# Patient Record
Sex: FEMALE | Race: BLACK OR AFRICAN AMERICAN | NOT HISPANIC OR LATINO | URBAN - METROPOLITAN AREA
[De-identification: names, ages, dates, MRNs, and addresses within clinical notes are randomized per-mention and may not be internally consistent; named-entity substitution may affect disease eponyms.]

---

## 2018-03-23 LAB
HPV HIGH RISK (HISTORICAL): NEGATIVE
SPECIMEN SOURCE (HISTORICAL): NORMAL

## 2018-04-12 ENCOUNTER — CONVERSION ENCOUNTER (OUTPATIENT)
Dept: MAMMOGRAPHY | Facility: CLINIC | Age: 53
End: 2018-04-12

## 2018-04-12 LAB
ABSOL LYMPHOCYTES (HISTORICAL): 2 K/UL (ref 0.5–4)
ALBUMIN SERPL BCP-MCNC: 3.2 G/DL (ref 3–5.2)
ALP SERPL-CCNC: 104 U/L (ref 43–122)
ALT SERPL W P-5'-P-CCNC: 25 U/L (ref 9–52)
AMORPHOUS MATERIAL (HISTORICAL): ABNORMAL
ANION GAP SERPL CALCULATED.3IONS-SCNC: 7 MMOL/L (ref 5–14)
AST SERPL W P-5'-P-CCNC: 17 U/L (ref 14–36)
BACTERIA UR QL AUTO: ABNORMAL
BASOPHILS # BLD AUTO: 0.1 K/UL (ref 0–0.1)
BASOPHILS # BLD AUTO: 1 % (ref 0–1)
BILIRUB SERPL-MCNC: <0.1 MG/DL
BILIRUB UR QL STRIP: NEGATIVE MG/DL
BUN SERPL-MCNC: 23 MG/DL (ref 5–25)
CALCIUM SERPL-MCNC: 9 MG/DL (ref 8.4–10.2)
CASTS/CASTS TYPE (HISTORICAL): ABNORMAL /LPF
CHLORIDE SERPL-SCNC: 101 MEQ/L (ref 97–108)
CLARITY UR: CLEAR
CO2 SERPL-SCNC: 27 MMOL/L (ref 22–30)
COLOR UR: ABNORMAL
CREATINE, SERUM (HISTORICAL): 0.99 MG/DL (ref 0.6–1.2)
CRYSTAL TYPE (HISTORICAL): ABNORMAL /HPF
DEPRECATED RDW RBC AUTO: 13.6 %
EGFR (HISTORICAL): 59 ML/MIN/1.73 M2
EOSINOPHIL # BLD AUTO: 0.2 K/UL (ref 0–0.4)
EOSINOPHIL NFR BLD AUTO: 2 % (ref 0–6)
GLUCOSE SERPL-MCNC: 295 MG/DL (ref 70–99)
GLUCOSE UR STRIP-MCNC: >=1000 MG/DL
HCT VFR BLD AUTO: 36.1 % (ref 36–46)
HGB BLD-MCNC: 11.9 G/DL (ref 12–16)
HGB UR QL STRIP.AUTO: ABNORMAL
KETONES UR STRIP-MCNC: NEGATIVE MG/DL
LEUKOCYTE ESTERASE UR QL STRIP: NEGATIVE
LYMPHOCYTES NFR BLD AUTO: 21 % (ref 25–45)
MCH RBC QN AUTO: 28.1 PG (ref 26–34)
MCHC RBC AUTO-ENTMCNC: 33 % (ref 31–36)
MCV RBC AUTO: 85 FL (ref 80–100)
MONOCYTES # BLD AUTO: 0.7 K/UL (ref 0.2–0.9)
MONOCYTES NFR BLD AUTO: 8 % (ref 1–10)
MUCOUS THREADS URNS QL MICRO: ABNORMAL
NEUTROPHILS ABS COUNT (HISTORICAL): 6.3 K/UL (ref 1.8–7.8)
NEUTS SEG NFR BLD AUTO: 68 % (ref 45–65)
NITRITE UR QL STRIP: NEGATIVE
NON-SQ EPI CELLS URNS QL MICRO: ABNORMAL
OTHER STN SPEC: ABNORMAL
PH UR STRIP.AUTO: 6 [PH] (ref 4.5–8)
PLATELET # BLD AUTO: 273 K/MCL (ref 150–450)
POTASSIUM SERPL-SCNC: 4.7 MEQ/L (ref 3.6–5)
PROT UR STRIP-MCNC: 100 MG/DL
RBC # BLD AUTO: 4.23 M/MCL (ref 4–5.2)
RBC #/AREA URNS AUTO: 6 /HPF
SODIUM SERPL-SCNC: 136 MEQ/L (ref 137–147)
SP GR UR STRIP.AUTO: 1.01 (ref 1–1.04)
TOTAL PROTEIN (HISTORICAL): 5.9 G/DL (ref 5.9–8.4)
UROBILINOGEN UR QL STRIP.AUTO: NEGATIVE MG/DL (ref 0–1)
WBC # BLD AUTO: 9.3 K/MCL (ref 4.5–11)
WBC #/AREA URNS AUTO: ABNORMAL /HPF

## 2018-05-10 LAB
CALCIUM SERPL-MCNC: 7.7 MG/DL (ref 8.4–10.2)
GLUCOSE SERPL-MCNC: 106 MG/DL (ref 70–99)
GLUCOSE SERPL-MCNC: 133 MG/DL (ref 70–99)
GLUCOSE SERPL-MCNC: 246 MG/DL (ref 70–99)
GLUCOSE SERPL-MCNC: 249 MG/DL (ref 70–99)
PREGNANCY TEST URINE (HISTORICAL): NEGATIVE

## 2018-05-11 LAB
ABSOL LYMPHOCYTES (HISTORICAL): 1.5 K/UL (ref 0.5–4)
ALBUMIN SERPL BCP-MCNC: 3 G/DL (ref 3–5.2)
ALP SERPL-CCNC: 100 U/L (ref 43–122)
ALT SERPL W P-5'-P-CCNC: 37 U/L (ref 9–52)
ANION GAP SERPL CALCULATED.3IONS-SCNC: 10 MMOL/L (ref 5–14)
AST SERPL W P-5'-P-CCNC: 28 U/L (ref 14–36)
BASOPHILS # BLD AUTO: 0.2 K/UL (ref 0–0.1)
BASOPHILS # BLD AUTO: 1 % (ref 0–1)
BILIRUB SERPL-MCNC: 0.3 MG/DL
BUN SERPL-MCNC: 27 MG/DL (ref 5–25)
CALCIUM SERPL-MCNC: 8.3 MG/DL (ref 8.4–10.2)
CHLORIDE SERPL-SCNC: 104 MEQ/L (ref 97–108)
CO2 SERPL-SCNC: 21 MMOL/L (ref 22–30)
COMMENT (HISTORICAL): ABNORMAL
CREATINE, SERUM (HISTORICAL): 1 MG/DL (ref 0.6–1.2)
DEPRECATED RDW RBC AUTO: 14.1 %
EGFR (HISTORICAL): 58 ML/MIN/1.73 M2
EOSINOPHIL # BLD AUTO: 0 K/UL (ref 0–0.4)
EOSINOPHIL NFR BLD AUTO: 0 % (ref 0–6)
FERRITIN SERPL-MCNC: 28 NG/ML (ref 11–264)
GLUCOSE SERPL-MCNC: 196 MG/DL (ref 70–99)
GLUCOSE SERPL-MCNC: 221 MG/DL (ref 70–99)
GLUCOSE SERPL-MCNC: 238 MG/DL (ref 70–99)
GLUCOSE SERPL-MCNC: 245 MG/DL (ref 70–99)
GLUCOSE SERPL-MCNC: 311 MG/DL (ref 70–99)
HCT VFR BLD AUTO: 34.3 % (ref 36–46)
HGB BLD-MCNC: 11.2 G/DL (ref 12–16)
LYMPHOCYTES NFR BLD AUTO: 8 % (ref 25–45)
MCH RBC QN AUTO: 28.2 PG (ref 26–34)
MCHC RBC AUTO-ENTMCNC: 32.6 % (ref 31–36)
MCV RBC AUTO: 87 FL (ref 80–100)
MONOCYTES # BLD AUTO: 2.1 K/UL (ref 0.2–0.9)
MONOCYTES NFR BLD AUTO: 11 % (ref 1–10)
NEUTROPHILS ABS COUNT (HISTORICAL): 15.4 K/UL (ref 1.8–7.8)
NEUTS SEG NFR BLD AUTO: 80 % (ref 45–65)
PLATELET # BLD AUTO: 249 K/MCL (ref 150–450)
POTASSIUM SERPL-SCNC: 4.3 MEQ/L (ref 3.6–5)
RBC # BLD AUTO: 3.96 M/MCL (ref 4–5.2)
RBC MORPHOLOGY (HISTORICAL): ABNORMAL
SODIUM SERPL-SCNC: 134 MEQ/L (ref 137–147)
TOTAL PROTEIN (HISTORICAL): 5.5 G/DL (ref 5.9–8.4)
VIT B12 SERPL-MCNC: 611 PG/ML (ref 239–931)
WBC # BLD AUTO: 19.2 K/MCL (ref 4.5–11)

## 2018-05-12 LAB
ABSOL LYMPHOCYTES (HISTORICAL): 2.5 K/UL (ref 0.5–4)
ALBUMIN SERPL BCP-MCNC: 3 G/DL (ref 3–5.2)
ALP SERPL-CCNC: 151 U/L (ref 43–122)
ALT SERPL W P-5'-P-CCNC: 42 U/L (ref 9–52)
ANION GAP SERPL CALCULATED.3IONS-SCNC: 6 MMOL/L (ref 5–14)
AST SERPL W P-5'-P-CCNC: 33 U/L (ref 14–36)
BASOPHILS # BLD AUTO: 0 % (ref 0–1)
BASOPHILS # BLD AUTO: 0 K/UL (ref 0–0.1)
BILIRUB SERPL-MCNC: 0.2 MG/DL
BUN SERPL-MCNC: 31 MG/DL (ref 5–25)
CALCIUM SERPL-MCNC: 8.5 MG/DL (ref 8.4–10.2)
CHLORIDE SERPL-SCNC: 106 MEQ/L (ref 97–108)
CO2 SERPL-SCNC: 22 MMOL/L (ref 22–30)
COMMENT (HISTORICAL): ABNORMAL
CREATINE, SERUM (HISTORICAL): 1.45 MG/DL (ref 0.6–1.2)
DEPRECATED RDW RBC AUTO: 14.3 %
EGFR (HISTORICAL): 38 ML/MIN/1.73 M2
EOSINOPHIL # BLD AUTO: 0.2 K/UL (ref 0–0.4)
EOSINOPHIL NFR BLD AUTO: 1 % (ref 0–6)
GLUCOSE SERPL-MCNC: 178 MG/DL (ref 70–99)
GLUCOSE SERPL-MCNC: 193 MG/DL (ref 70–99)
GLUCOSE SERPL-MCNC: 196 MG/DL (ref 70–99)
GLUCOSE SERPL-MCNC: 198 MG/DL (ref 70–99)
GLUCOSE SERPL-MCNC: 201 MG/DL (ref 70–99)
GLUCOSE SERPL-MCNC: 213 MG/DL (ref 70–99)
GLUCOSE SERPL-MCNC: 255 MG/DL (ref 70–99)
HCT VFR BLD AUTO: 33.4 % (ref 36–46)
HGB BLD-MCNC: 10.7 G/DL (ref 12–16)
LYMPHOCYTES NFR BLD AUTO: 15 % (ref 25–45)
MCH RBC QN AUTO: 28.2 PG (ref 26–34)
MCHC RBC AUTO-ENTMCNC: 32 % (ref 31–36)
MCV RBC AUTO: 88 FL (ref 80–100)
MONOCYTES # BLD AUTO: 2 K/UL (ref 0.2–0.9)
MONOCYTES NFR BLD AUTO: 12 % (ref 1–10)
NEUTROPHILS ABS COUNT (HISTORICAL): 11.7 K/UL (ref 1.8–7.8)
NEUTS SEG NFR BLD AUTO: 72 % (ref 45–65)
PLATELET # BLD AUTO: 166 K/MCL (ref 150–450)
POTASSIUM SERPL-SCNC: 4.6 MEQ/L (ref 3.6–5)
RBC # BLD AUTO: 3.79 M/MCL (ref 4–5.2)
RBC MORPHOLOGY (HISTORICAL): ABNORMAL
SODIUM SERPL-SCNC: 134 MEQ/L (ref 137–147)
TOTAL PROTEIN (HISTORICAL): 5.6 G/DL (ref 5.9–8.4)
WBC # BLD AUTO: 16.4 K/MCL (ref 4.5–11)

## 2018-05-13 LAB
ABSOL LYMPHOCYTES (HISTORICAL): 2.2 K/UL (ref 0.5–4)
ALBUMIN SERPL BCP-MCNC: 2.6 G/DL (ref 3–5.2)
ALP SERPL-CCNC: 182 U/L (ref 43–122)
ALT SERPL W P-5'-P-CCNC: 56 U/L (ref 9–52)
AMORPHOUS MATERIAL (HISTORICAL): NORMAL
ANION GAP SERPL CALCULATED.3IONS-SCNC: 7 MMOL/L (ref 5–14)
AST SERPL W P-5'-P-CCNC: 55 U/L (ref 14–36)
BACTERIA UR QL AUTO: NORMAL
BANDS (HISTORICAL): 2 % (ref 3–11)
BILIRUB SERPL-MCNC: 0.1 MG/DL
BILIRUB UR QL STRIP: NEGATIVE MG/DL
BUN SERPL-MCNC: 31 MG/DL (ref 5–25)
CALCIUM SERPL-MCNC: 8.5 MG/DL (ref 8.4–10.2)
CASTS/CASTS TYPE (HISTORICAL): NORMAL /LPF
CHLORIDE SERPL-SCNC: 110 MEQ/L (ref 97–108)
CK SERPL-CCNC: 207 U/L (ref 30–135)
CLARITY UR: CLEAR
CO2 SERPL-SCNC: 21 MMOL/L (ref 22–30)
COLOR UR: YELLOW
COMMENT (HISTORICAL): ABNORMAL
CREATINE, SERUM (HISTORICAL): 1.05 MG/DL (ref 0.6–1.2)
CRYSTAL TYPE (HISTORICAL): NORMAL /HPF
DEPRECATED RDW RBC AUTO: 14.3 %
EGFR (HISTORICAL): 55 ML/MIN/1.73 M2
EOSINOPHIL # BLD AUTO: 0.3 K/UL (ref 0–0.4)
EOSINOPHIL NFR BLD AUTO: 2 % (ref 0–6)
GLUCOSE SERPL-MCNC: 118 MG/DL (ref 70–99)
GLUCOSE SERPL-MCNC: 120 MG/DL (ref 70–99)
GLUCOSE SERPL-MCNC: 131 MG/DL (ref 70–99)
GLUCOSE SERPL-MCNC: 153 MG/DL (ref 70–99)
GLUCOSE SERPL-MCNC: 154 MG/DL (ref 70–99)
GLUCOSE SERPL-MCNC: 160 MG/DL (ref 70–99)
GLUCOSE UR STRIP-MCNC: 50 MG/DL
HCT VFR BLD AUTO: 32.3 % (ref 36–46)
HGB BLD-MCNC: 10.6 G/DL (ref 12–16)
HGB UR QL STRIP.AUTO: ABNORMAL
KETONES UR STRIP-MCNC: NEGATIVE MG/DL
LEUKOCYTE ESTERASE UR QL STRIP: NEGATIVE
LYMPHOCYTES NFR BLD AUTO: 14 % (ref 25–45)
MCH RBC QN AUTO: 28.6 PG (ref 26–34)
MCHC RBC AUTO-ENTMCNC: 32.9 % (ref 31–36)
MCV RBC AUTO: 87 FL (ref 80–100)
MONOCYTES # BLD AUTO: 1.2 K/UL (ref 0.2–0.9)
MONOCYTES NFR BLD AUTO: 8 % (ref 1–10)
MUCOUS THREADS URNS QL MICRO: NORMAL
NEUTROPHILS ABS COUNT (HISTORICAL): 11.8 K/UL (ref 1.8–7.8)
NEUTS SEG NFR BLD AUTO: 74 % (ref 45–65)
NITRITE UR QL STRIP: NEGATIVE
NON-SQ EPI CELLS URNS QL MICRO: NORMAL
OTHER STN SPEC: NORMAL
PH UR STRIP.AUTO: 6 [PH] (ref 4.5–8)
PLATELET # BLD AUTO: 227 K/MCL (ref 150–450)
POTASSIUM SERPL-SCNC: 4.7 MEQ/L (ref 3.6–5)
PROLACTIN (HISTORICAL): 38 NG/ML
PROT UR STRIP-MCNC: 100 MG/DL
RBC # BLD AUTO: 3.71 M/MCL (ref 4–5.2)
RBC #/AREA URNS AUTO: NORMAL /HPF
RBC MORPHOLOGY (HISTORICAL): ABNORMAL
SODIUM SERPL-SCNC: 137 MEQ/L (ref 137–147)
SP GR UR STRIP.AUTO: 1.02 (ref 1–1.04)
TOTAL PROTEIN (HISTORICAL): 5.2 G/DL (ref 5.9–8.4)
UROBILINOGEN UR QL STRIP.AUTO: NEGATIVE MG/DL (ref 0–1)
WBC # BLD AUTO: 15.5 K/MCL (ref 4.5–11)
WBC #/AREA URNS AUTO: NORMAL /HPF

## 2018-05-14 LAB
10-HYDROXYCARBAZEPINE (HISTORICAL): 10
ABSOL LYMPHOCYTES (HISTORICAL): 2.3 K/UL (ref 0.5–4)
ANION GAP SERPL CALCULATED.3IONS-SCNC: 6 MMOL/L (ref 5–14)
BASOPHILS # BLD AUTO: 0.1 K/UL (ref 0–0.1)
BASOPHILS # BLD AUTO: 1 % (ref 0–1)
BUN SERPL-MCNC: 33 MG/DL (ref 5–25)
CALCIUM SERPL-MCNC: 8.7 MG/DL (ref 8.4–10.2)
CHLORIDE SERPL-SCNC: 109 MEQ/L (ref 97–108)
CO2 SERPL-SCNC: 23 MMOL/L (ref 22–30)
CREATINE, SERUM (HISTORICAL): 1.1 MG/DL (ref 0.6–1.2)
DEPRECATED RDW RBC AUTO: 14 %
EGFR (HISTORICAL): 52 ML/MIN/1.73 M2
EOSINOPHIL # BLD AUTO: 0.3 K/UL (ref 0–0.4)
EOSINOPHIL NFR BLD AUTO: 2 % (ref 0–6)
GLUCOSE FASTING (HISTORICAL): 114 MG/DL (ref 70–99)
GLUCOSE SERPL-MCNC: 157 MG/DL (ref 70–99)
GLUCOSE SERPL-MCNC: 175 MG/DL (ref 70–99)
GLUCOSE SERPL-MCNC: 189 MG/DL (ref 70–99)
GLUCOSE SERPL-MCNC: 260 MG/DL (ref 70–99)
HCT VFR BLD AUTO: 31.6 % (ref 36–46)
HGB BLD-MCNC: 10.2 G/DL (ref 12–16)
LYMPHOCYTES NFR BLD AUTO: 19 % (ref 25–45)
MCH RBC QN AUTO: 28 PG (ref 26–34)
MCHC RBC AUTO-ENTMCNC: 32.3 % (ref 31–36)
MCV RBC AUTO: 87 FL (ref 80–100)
MONOCYTES # BLD AUTO: 1.2 K/UL (ref 0.2–0.9)
MONOCYTES NFR BLD AUTO: 10 % (ref 1–10)
NEUTROPHILS ABS COUNT (HISTORICAL): 8.6 K/UL (ref 1.8–7.8)
NEUTS SEG NFR BLD AUTO: 68 % (ref 45–65)
PLATELET # BLD AUTO: 231 K/MCL (ref 150–450)
POTASSIUM SERPL-SCNC: 4.7 MEQ/L (ref 3.6–5)
PROLACTIN (HISTORICAL): 25.1 NG/ML
RBC # BLD AUTO: 3.63 M/MCL (ref 4–5.2)
SODIUM SERPL-SCNC: 138 MEQ/L (ref 137–147)
WBC # BLD AUTO: 12.4 K/MCL (ref 4.5–11)

## 2018-05-15 LAB
ABSOL LYMPHOCYTES (HISTORICAL): 2.4 K/UL (ref 0.5–4)
ALBUMIN SERPL BCP-MCNC: 2.5 G/DL (ref 3–5.2)
ALP SERPL-CCNC: 199 U/L (ref 43–122)
ALT SERPL W P-5'-P-CCNC: 49 U/L (ref 9–52)
ANION GAP SERPL CALCULATED.3IONS-SCNC: 5 MMOL/L (ref 5–14)
AST SERPL W P-5'-P-CCNC: 24 U/L (ref 14–36)
BASOPHILS # BLD AUTO: 0 % (ref 0–1)
BASOPHILS # BLD AUTO: 0 K/UL (ref 0–0.1)
BILIRUB SERPL-MCNC: 0.1 MG/DL
BUN SERPL-MCNC: 29 MG/DL (ref 5–25)
CALCIUM SERPL-MCNC: 8.6 MG/DL (ref 8.4–10.2)
CHLORIDE SERPL-SCNC: 105 MEQ/L (ref 97–108)
CO2 SERPL-SCNC: 26 MMOL/L (ref 22–30)
CREATINE, SERUM (HISTORICAL): 0.95 MG/DL (ref 0.6–1.2)
DEPRECATED RDW RBC AUTO: 13.8 %
EGFR (HISTORICAL): >60 ML/MIN/1.73 M2
EOSINOPHIL # BLD AUTO: 0.4 K/UL (ref 0–0.4)
EOSINOPHIL NFR BLD AUTO: 3 % (ref 0–6)
GLUCOSE SERPL-MCNC: 120 MG/DL (ref 70–99)
GLUCOSE SERPL-MCNC: 171 MG/DL (ref 70–99)
GLUCOSE SERPL-MCNC: 181 MG/DL (ref 70–99)
GLUCOSE SERPL-MCNC: 219 MG/DL (ref 70–99)
GLUCOSE SERPL-MCNC: 85 MG/DL (ref 70–99)
HCT VFR BLD AUTO: 29.8 % (ref 36–46)
HGB BLD-MCNC: 9.8 G/DL (ref 12–16)
LYMPHOCYTES NFR BLD AUTO: 19 % (ref 25–45)
MCH RBC QN AUTO: 28.5 PG (ref 26–34)
MCHC RBC AUTO-ENTMCNC: 32.9 % (ref 31–36)
MCV RBC AUTO: 87 FL (ref 80–100)
MONOCYTES # BLD AUTO: 1.5 K/UL (ref 0.2–0.9)
MONOCYTES NFR BLD AUTO: 11 % (ref 1–10)
NEUTROPHILS ABS COUNT (HISTORICAL): 8.5 K/UL (ref 1.8–7.8)
NEUTS SEG NFR BLD AUTO: 67 % (ref 45–65)
PLATELET # BLD AUTO: 241 K/MCL (ref 150–450)
POTASSIUM SERPL-SCNC: 4.5 MEQ/L (ref 3.6–5)
RBC # BLD AUTO: 3.45 M/MCL (ref 4–5.2)
SODIUM SERPL-SCNC: 136 MEQ/L (ref 137–147)
TOTAL PROTEIN (HISTORICAL): 5.1 G/DL (ref 5.9–8.4)
WBC # BLD AUTO: 12.8 K/MCL (ref 4.5–11)

## 2018-05-16 LAB
10-HYDROXYCARBAZEPINE (HISTORICAL): 20
ABSOL LYMPHOCYTES (HISTORICAL): 2.1 K/UL (ref 0.5–4)
ANION GAP SERPL CALCULATED.3IONS-SCNC: 6 MMOL/L (ref 5–14)
BASOPHILS # BLD AUTO: 0.1 K/UL (ref 0–0.1)
BASOPHILS # BLD AUTO: 1 % (ref 0–1)
BUN SERPL-MCNC: 28 MG/DL (ref 5–25)
CALCIUM SERPL-MCNC: 9.1 MG/DL (ref 8.4–10.2)
CHLORIDE SERPL-SCNC: 103 MEQ/L (ref 97–108)
CO2 SERPL-SCNC: 27 MMOL/L (ref 22–30)
CREATINE, SERUM (HISTORICAL): 1.04 MG/DL (ref 0.6–1.2)
DEPRECATED RDW RBC AUTO: 13.8 %
EGFR (HISTORICAL): 56 ML/MIN/1.73 M2
EOSINOPHIL # BLD AUTO: 0.2 K/UL (ref 0–0.4)
EOSINOPHIL NFR BLD AUTO: 1 % (ref 0–6)
GLUCOSE SERPL-MCNC: 112 MG/DL (ref 70–99)
GLUCOSE SERPL-MCNC: 133 MG/DL (ref 70–99)
GLUCOSE SERPL-MCNC: 170 MG/DL (ref 70–99)
GLUCOSE SERPL-MCNC: 210 MG/DL (ref 70–99)
GLUCOSE SERPL-MCNC: 212 MG/DL (ref 70–99)
HCT VFR BLD AUTO: 30.5 % (ref 36–46)
HGB BLD-MCNC: 10 G/DL (ref 12–16)
LYMPHOCYTES NFR BLD AUTO: 15 % (ref 25–45)
MCH RBC QN AUTO: 28.2 PG (ref 26–34)
MCHC RBC AUTO-ENTMCNC: 32.9 % (ref 31–36)
MCV RBC AUTO: 86 FL (ref 80–100)
MONOCYTES # BLD AUTO: 1.8 K/UL (ref 0.2–0.9)
MONOCYTES NFR BLD AUTO: 12 % (ref 1–10)
NEUTROPHILS ABS COUNT (HISTORICAL): 10.4 K/UL (ref 1.8–7.8)
NEUTS SEG NFR BLD AUTO: 71 % (ref 45–65)
PLATELET # BLD AUTO: 270 K/MCL (ref 150–450)
POTASSIUM SERPL-SCNC: 4.3 MEQ/L (ref 3.6–5)
RBC # BLD AUTO: 3.55 M/MCL (ref 4–5.2)
SODIUM SERPL-SCNC: 136 MEQ/L (ref 137–147)
TSH SERPL DL<=0.05 MIU/L-ACNC: 0.02 UIU/ML (ref 0.47–4.68)
WBC # BLD AUTO: 14.6 K/MCL (ref 4.5–11)

## 2018-05-17 LAB
ABSOL LYMPHOCYTES (HISTORICAL): 2.3 K/UL (ref 0.5–4)
AMORPHOUS MATERIAL (HISTORICAL): ABNORMAL
ANION GAP SERPL CALCULATED.3IONS-SCNC: 5 MMOL/L (ref 5–14)
BACTERIA UR QL AUTO: ABNORMAL
BASOPHILS # BLD AUTO: 0.1 K/UL (ref 0–0.1)
BASOPHILS # BLD AUTO: 1 % (ref 0–1)
BILIRUB UR QL STRIP: NEGATIVE MG/DL
BUN SERPL-MCNC: 26 MG/DL (ref 5–25)
CALCIUM SERPL-MCNC: 8.9 MG/DL (ref 8.4–10.2)
CASTS/CASTS TYPE (HISTORICAL): ABNORMAL /LPF
CHLORIDE SERPL-SCNC: 102 MEQ/L (ref 97–108)
CLARITY UR: CLEAR
CO2 SERPL-SCNC: 27 MMOL/L (ref 22–30)
COLOR UR: ABNORMAL
COMMENT (HISTORICAL): ABNORMAL
CREATINE, SERUM (HISTORICAL): 1.05 MG/DL (ref 0.6–1.2)
CRYSTAL TYPE (HISTORICAL): ABNORMAL /HPF
DEPRECATED RDW RBC AUTO: 13.8 %
EGFR (HISTORICAL): 55 ML/MIN/1.73 M2
EOSINOPHIL # BLD AUTO: 0.3 K/UL (ref 0–0.4)
EOSINOPHIL NFR BLD AUTO: 2 % (ref 0–6)
GLUCOSE SERPL-MCNC: 153 MG/DL (ref 70–99)
GLUCOSE SERPL-MCNC: 179 MG/DL (ref 70–99)
GLUCOSE SERPL-MCNC: 192 MG/DL (ref 70–99)
GLUCOSE SERPL-MCNC: 264 MG/DL (ref 70–99)
GLUCOSE SERPL-MCNC: 341 MG/DL (ref 70–99)
GLUCOSE UR STRIP-MCNC: NEGATIVE MG/DL
HCT VFR BLD AUTO: 28.6 % (ref 36–46)
HGB BLD-MCNC: 9.4 G/DL (ref 12–16)
HGB UR QL STRIP.AUTO: ABNORMAL
KETONES UR STRIP-MCNC: NEGATIVE MG/DL
LEUKOCYTE ESTERASE UR QL STRIP: NEGATIVE
LYMPHOCYTES NFR BLD AUTO: 18 % (ref 25–45)
MCH RBC QN AUTO: 27.9 PG (ref 26–34)
MCHC RBC AUTO-ENTMCNC: 32.7 % (ref 31–36)
MCV RBC AUTO: 85 FL (ref 80–100)
MONOCYTES # BLD AUTO: 1.8 K/UL (ref 0.2–0.9)
MONOCYTES NFR BLD AUTO: 14 % (ref 1–10)
MUCOUS THREADS URNS QL MICRO: ABNORMAL
NEUTROPHILS ABS COUNT (HISTORICAL): 8.4 K/UL (ref 1.8–7.8)
NEUTS SEG NFR BLD AUTO: 65 % (ref 45–65)
NITRITE UR QL STRIP: NEGATIVE
NON-SQ EPI CELLS URNS QL MICRO: ABNORMAL
OTHER STN SPEC: ABNORMAL
PH UR STRIP.AUTO: 5 [PH] (ref 4.5–8)
PLATELET # BLD AUTO: 239 K/MCL (ref 150–450)
POTASSIUM SERPL-SCNC: 4.2 MEQ/L (ref 3.6–5)
PROT UR STRIP-MCNC: 30 MG/DL
RBC # BLD AUTO: 3.36 M/MCL (ref 4–5.2)
RBC #/AREA URNS AUTO: 3 /HPF
RBC MORPHOLOGY (HISTORICAL): ABNORMAL
SODIUM SERPL-SCNC: 134 MEQ/L (ref 137–147)
SP GR UR STRIP.AUTO: 1.01 (ref 1–1.04)
UROBILINOGEN UR QL STRIP.AUTO: NEGATIVE MG/DL (ref 0–1)
WBC # BLD AUTO: 12.9 K/MCL (ref 4.5–11)
WBC #/AREA URNS AUTO: ABNORMAL /HPF

## 2018-05-18 LAB
GLUCOSE SERPL-MCNC: 124 MG/DL (ref 70–99)
GLUCOSE SERPL-MCNC: 137 MG/DL (ref 70–99)
TSH SERPL DL<=0.05 MIU/L-ACNC: 0.09 UIU/ML (ref 0.47–4.68)

## 2018-06-22 LAB
ABSOL LYMPHOCYTES (HISTORICAL): 1.3 K/UL (ref 0.5–4)
ALBUMIN SERPL BCP-MCNC: 3.4 G/DL (ref 3–5.2)
ALP SERPL-CCNC: 103 U/L (ref 43–122)
ALT SERPL W P-5'-P-CCNC: 32 U/L (ref 9–52)
ANION GAP SERPL CALCULATED.3IONS-SCNC: 7 MMOL/L (ref 5–14)
AST SERPL W P-5'-P-CCNC: 26 U/L (ref 14–36)
BASOPHILS # BLD AUTO: 0.1 K/UL (ref 0–0.1)
BASOPHILS # BLD AUTO: 1 % (ref 0–1)
BILIRUB SERPL-MCNC: 0.1 MG/DL
BUN SERPL-MCNC: 31 MG/DL (ref 5–25)
CALCIUM SERPL-MCNC: 8.9 MG/DL (ref 8.4–10.2)
CHLORIDE SERPL-SCNC: 102 MEQ/L (ref 97–108)
CO2 SERPL-SCNC: 29 MMOL/L (ref 22–30)
COMMENT (HISTORICAL): ABNORMAL
CREATINE, SERUM (HISTORICAL): 1.1 MG/DL (ref 0.6–1.2)
DEPRECATED RDW RBC AUTO: 15 %
EGFR (HISTORICAL): 52 ML/MIN/1.73 M2
EOSINOPHIL # BLD AUTO: 0.2 K/UL (ref 0–0.4)
EOSINOPHIL NFR BLD AUTO: 2 % (ref 0–6)
GLUCOSE SERPL-MCNC: 113 MG/DL (ref 70–99)
GLUCOSE SERPL-MCNC: 122 MG/DL (ref 70–99)
HCT VFR BLD AUTO: 36.7 % (ref 36–46)
HGB BLD-MCNC: 12.4 G/DL (ref 12–16)
LYMPHOCYTES NFR BLD AUTO: 12 % (ref 25–45)
MCH RBC QN AUTO: 29.4 PG (ref 26–34)
MCHC RBC AUTO-ENTMCNC: 33.9 % (ref 31–36)
MCV RBC AUTO: 87 FL (ref 80–100)
MONOCYTES # BLD AUTO: 1 K/UL (ref 0.2–0.9)
MONOCYTES NFR BLD AUTO: 9 % (ref 1–10)
NEUTROPHILS ABS COUNT (HISTORICAL): 8.4 K/UL (ref 1.8–7.8)
NEUTS SEG NFR BLD AUTO: 76 % (ref 45–65)
PLATELET # BLD AUTO: 275 K/MCL (ref 150–450)
POTASSIUM SERPL-SCNC: 3.7 MEQ/L (ref 3.6–5)
RBC # BLD AUTO: 4.22 M/MCL (ref 4–5.2)
RBC MORPHOLOGY (HISTORICAL): ABNORMAL
SODIUM SERPL-SCNC: 139 MEQ/L (ref 137–147)
TOTAL PROTEIN (HISTORICAL): 6.4 G/DL (ref 5.9–8.4)
WBC # BLD AUTO: 11 K/MCL (ref 4.5–11)

## 2018-06-26 LAB — 10-HYDROXYCARBAZEPINE (HISTORICAL): <1

## 2018-06-27 ENCOUNTER — TELEPHONE (OUTPATIENT)
Dept: FAMILY MEDICINE CLINIC | Facility: CLINIC | Age: 53
End: 2018-06-27

## 2018-06-27 NOTE — TELEPHONE ENCOUNTER
Kuldip Infante states pt BP was 160/98 today's visit  She states pt was reporting a lot of pain and hadn't taken her pain meds prior to her BP being taken  Pt is asymptomatic at this time  She said they will call Friday if BP is still elevated  I told them to make sure patient isn't reporting severe pain when they take it as this could be a factor in her elevated reading today

## 2018-07-25 DIAGNOSIS — E89.0 POSTOPERATIVE HYPOTHYROIDISM: Primary | ICD-10-CM

## 2018-07-25 DIAGNOSIS — Z79.4 TYPE 2 DIABETES MELLITUS WITHOUT COMPLICATION, WITH LONG-TERM CURRENT USE OF INSULIN (HCC): Primary | ICD-10-CM

## 2018-07-25 DIAGNOSIS — E11.9 TYPE 2 DIABETES MELLITUS WITHOUT COMPLICATION, WITH LONG-TERM CURRENT USE OF INSULIN (HCC): Primary | ICD-10-CM

## 2018-07-25 RX ORDER — LANCETS 33 GAUGE
EACH MISCELLANEOUS
Qty: 120 EACH | Refills: 2 | Status: SHIPPED | OUTPATIENT
Start: 2018-07-25 | End: 2018-10-16

## 2018-07-25 RX ORDER — LEVOTHYROXINE SODIUM 175 UG/1
TABLET ORAL
Qty: 30 TABLET | Refills: 1 | Status: SHIPPED | OUTPATIENT
Start: 2018-07-25

## 2018-11-30 ENCOUNTER — HOSPITAL ENCOUNTER (EMERGENCY)
Dept: HOSPITAL 31 - C.ER | Age: 53
Discharge: HOME | End: 2018-11-30
Payer: MEDICAID

## 2018-11-30 VITALS — TEMPERATURE: 98 F | OXYGEN SATURATION: 97 %

## 2018-11-30 VITALS — RESPIRATION RATE: 16 BRPM | SYSTOLIC BLOOD PRESSURE: 142 MMHG | HEART RATE: 86 BPM | DIASTOLIC BLOOD PRESSURE: 88 MMHG

## 2018-11-30 VITALS — BODY MASS INDEX: 39.5 KG/M2

## 2018-11-30 DIAGNOSIS — I10: ICD-10-CM

## 2018-11-30 DIAGNOSIS — R10.9: Primary | ICD-10-CM

## 2018-11-30 DIAGNOSIS — E05.90: ICD-10-CM

## 2018-11-30 DIAGNOSIS — J45.909: ICD-10-CM

## 2018-11-30 DIAGNOSIS — Z90.49: ICD-10-CM

## 2018-11-30 DIAGNOSIS — E11.9: ICD-10-CM

## 2018-11-30 LAB
ALBUMIN SERPL-MCNC: 3.9 [, G/DL] (ref 3.5–5)
ALBUMIN/GLOB SERPL: 1.2 [,] (ref 1–2.1)
ALT SERPL-CCNC: 38 [, U/L] (ref 9–52)
AST SERPL-CCNC: 66 [, U/L] (ref 14–36)
BASE EXCESS BLDV CALC-SCNC: 4.5 [, MMOL/L] (ref 0–2)
BASOPHILS # BLD AUTO: 0.1 [, K/UL] (ref 0–0.2)
BASOPHILS NFR BLD: 0.9 [, %] (ref 0–2)
BILIRUB UR-MCNC: NEGATIVE [,]
BUN SERPL-MCNC: 21 [, MG/DL] (ref 7–17)
CALCIUM SERPL-MCNC: 9 [, MG/DL] (ref 8.6–10.4)
EOSINOPHIL # BLD AUTO: 0.2 [, K/UL] (ref 0–0.7)
EOSINOPHIL NFR BLD: 2.2 [, %] (ref 0–4)
ERYTHROCYTE [DISTWIDTH] IN BLOOD BY AUTOMATED COUNT: 14.9 [, %] (ref 11.5–14.5)
GFR NON-AFRICAN AMERICAN: 39 [,]
GLUCOSE UR STRIP-MCNC: NORMAL [, MG/DL]
HGB BLD-MCNC: 13.2 [, G/DL] (ref 11–16)
LEUKOCYTE ESTERASE UR-ACNC: (no result) [, LEU/UL]
LYMPHOCYTES # BLD AUTO: 3.1 [, K/UL] (ref 1–4.3)
LYMPHOCYTES NFR BLD AUTO: 27.7 [, %] (ref 20–40)
MCH RBC QN AUTO: 28.8 [, PG] (ref 27–31)
MCHC RBC AUTO-ENTMCNC: 33.1 [, G/DL] (ref 33–37)
MCV RBC AUTO: 87.1 [, FL] (ref 81–99)
MONOCYTES # BLD: 0.7 [, K/UL] (ref 0–0.8)
MONOCYTES NFR BLD: 6.6 [, %] (ref 0–10)
NEUTROPHILS # BLD: 7 [, K/UL] (ref 1.8–7)
NEUTROPHILS NFR BLD AUTO: 62.6 [, %] (ref 50–75)
NRBC BLD AUTO-RTO: 0 [, %] (ref 0–2)
PCO2 BLDV: 49 [, MMHG] (ref 40–60)
PH BLDV: 7.4 [,] (ref 7.32–7.43)
PH UR STRIP: 7 [,] (ref 5–8)
PLATELET # BLD: 238 [, K/UL] (ref 130–400)
PMV BLD AUTO: 8.7 [, FL] (ref 7.2–11.7)
PROT UR STRIP-MCNC: (no result) [, MG/DL]
RBC # BLD AUTO: 4.58 [, MIL/UL] (ref 3.8–5.2)
RBC # UR STRIP: (no result) [,]
SP GR UR STRIP: 1.01 [,] (ref 1–1.03)
SQUAMOUS EPITHIAL: 2 [, /HPF] (ref 0–5)
UROBILINOGEN UR-MCNC: NORMAL [, MG/DL] (ref 0.2–1)
VENOUS BLOOD FIO2: 21 [, %]
VENOUS BLOOD GAS PO2: 34 [, MM/HG] (ref 30–55)
WBC # BLD AUTO: 11.2 [, K/UL] (ref 4.8–10.8)

## 2018-11-30 PROCEDURE — 74177 CT ABD & PELVIS W/CONTRAST: CPT

## 2018-11-30 PROCEDURE — 85025 COMPLETE CBC W/AUTO DIFF WBC: CPT

## 2018-11-30 PROCEDURE — 81001 URINALYSIS AUTO W/SCOPE: CPT

## 2018-11-30 PROCEDURE — 82948 REAGENT STRIP/BLOOD GLUCOSE: CPT

## 2018-11-30 PROCEDURE — 82803 BLOOD GASES ANY COMBINATION: CPT

## 2018-11-30 PROCEDURE — 80053 COMPREHEN METABOLIC PANEL: CPT

## 2018-11-30 PROCEDURE — 96372 THER/PROPH/DIAG INJ SC/IM: CPT

## 2018-11-30 PROCEDURE — 99285 EMERGENCY DEPT VISIT HI MDM: CPT

## 2018-11-30 NOTE — C.PDOC
History Of Present Illness





Patient is a 53 year old Female with PMHx of DMII, seizures, HTN, neuropathy, 

and thyroid cancer who presents today for 2 weeks of abdominal pain radiating 

into her back and flank. Patient says she was evaluated 2 weeks ago at [facility] and 

thinks she had a chest xray done because she was also having chest and rib pain.

Patient was sent home and told to take Ibuprofen and Motrin. Since then patient 

says her abdominal pain has only worsened. Patient describes the pain as sharp 

stabbing pain that is epigastric and right sided and radiates to the right 

flank. Patient rates the pain 10/10. Patient also admits to swelling in her 

abdomen and LE swelling. Patient denies any dysuria or hematuria. Patient admits

to about 30lbs of weight gain since her thyroidectomy in August. Patient did not

take any of her home medications today due to the pain. Patient went to her PMD 

today and he called an ambulance to bring her to the ER. 





<Jesusita Tamayo - Last Filed: 11/30/18 16:58>


History Per: Patient


History/Exam Limitations: no limitations


Onset/Duration Of Symptoms: Days


Current Symptoms Are (Timing): Worse


Severity: Severe


Pain Scale Rating Of: 10


Location Of Pain/Discomfort: RUQ, Epigastric


Radiation Of Pain To:: Flank


Quality Of Discomfort: Sharp, Stabbing


Associated Symptoms: denies: Fever, Chills, Nausea, Vomiting, Diarrhea, 

Constipation


Exacerbating Factors: Movement, Deep Breaths


Alleviating Factors: None


Last Bowel Movement: Yesterday


Additional History Per: Patient


Abnormal Vaginal Bleeding: No





<Jesusita Tamayo - Last Filed: 11/30/18 16:58>





<Nora Altamirano - Last Filed: 11/30/18 18:21>


Time Seen by Provider: 11/30/18 15:01


Chief Complaint (Nursing): Abdominal Pain





Past Medical History


Vital Signs: 





                                Last Vital Signs











Temp  97.8 F   11/30/18 15:01


 


Pulse  72   11/30/18 15:01


 


Resp  20   11/30/18 15:01


 


BP  194/99 H  11/30/18 15:01


 


Pulse Ox  99   11/30/18 15:01














- Medical History


PMH: Asthma, Diabetes, HTN, Hyperthyroidism, Seizures


Surgical History: Cholecystectomy


Other Surgeries: thyroidectomy 2018


Family History: States: Unknown Family Hx





- Social History


Hx Tobacco Use: No


Hx Alcohol Use: No


Hx Substance Use: No





- Immunization History


Hx Tetanus Toxoid Vaccination: No


Hx Influenza Vaccination: No


Hx Pneumococcal Vaccination: No





<Jesusita Tamayo - Last Filed: 11/30/18 16:58>


Vital Signs: 





                                Last Vital Signs











Temp  97.8 F   11/30/18 15:01


 


Pulse  68   11/30/18 17:01


 


Resp  21   11/30/18 17:01


 


BP  194/122 H  11/30/18 17:02


 


Pulse Ox  100   11/30/18 17:01














<Nora Altamirano - Last Filed: 11/30/18 18:21>





Review Of Systems


Constitutional: Negative for: Fever, Chills


Cardiovascular: Negative for: Chest Pain, Palpitations


Respiratory: Negative for: Cough, Shortness of Breath


Gastrointestinal: Positive for: Abdominal Pain.  Negative for: Nausea, Vomiting,

Diarrhea, Constipation


Genitourinary: Negative for: Dysuria, Frequency, Hematuria


Musculoskeletal: Positive for: Back Pain (right sided back and flank pain)





<Jesusita Tamayo - Last Filed: 11/30/18 16:58>





Physical Exam





- Physical Exam


Appears: Non-toxic, In Acute Distress


Skin: Normal Color, Warm, Dry


Head: Atraumatic, Normacephalic


Eye(s): bilateral: Normal Inspection


Chest: Symmetrical


Cardiovascular: Rhythm Regular


Respiratory: Normal Breath Sounds, No Accessory Muscle Use, No Rales, No 

Rhonchi, No Stridor, No Wheezing


Gastrointestinal/Abdominal: Bowel Sounds, No Soft, Tenderness, Distention, 

Guarding, Other (distended)


Back: CVA Tenderness (right sided CVA tenderness)


Extremity: Pedal Edema (1+ pitting edema bilaterally)


Neurological/Psych: Oriented x3





<Jesusita Tamayo - Last Filed: 11/30/18 16:58>





ED Course And Treatment





- Laboratory Results


Result Diagrams: 


                                 11/30/18 16:20





O2 Sat by Pulse Oximetry: 99





<Jesusita Tamayo - Last Filed: 11/30/18 16:58>





- Laboratory Results


Result Diagrams: 


                                 11/30/18 16:20





                                 11/30/18 16:20


O2 Sat by Pulse Oximetry: 100 (RA)


Pulse Ox Interpretation: Normal


Progress Note: Blood work, UA, Upreg, CT abd/pelvis ordered and reviewed.  

Patient given IV morphine, IV labetolol for HTN.





<Nora Altamirano - Last Filed: 11/30/18 18:21>





Disposition





<Jesusita Tamayo - Last Filed: 11/30/18 16:58>





- Disposition


Disposition Time: 18:20





<Nora Altamirano - Last Filed: 11/30/18 18:21>





- Disposition


Condition: STABLE


Forms:  [employer] (English)





- Clinical Impression


Clinical Impression: 


 Abdominal pain








Physician Patient Turnover


Patient Signed Over To: Avelina Tai


Handoff Comments: Pending CT, reassessment.





<Nora Altamirano - Last Filed: 11/30/18 18:21>

## 2018-12-01 NOTE — CT
Date of service: 



11/30/2018



PROCEDURE:  CT Abdomen and Pelvis with intravenous contrast



HISTORY:

severe abdominal pain, abdominal distention



COMPARISON:

None.



TECHNIQUE:

Multiple contiguous axial images were performed through the abdomen 

and pelvis with the use of intravenous contrast.  Subsequently, 

sagittal and coronal reformatted images were obtained. 



Radiation dose:



Total exam DLP = 1228.66 mGy-cm.



This CT exam was performed using one or more of the following dose 

reduction techniques: Automated exposure control, adjustment of the 

mA and/or kV according to patient size, and/or use of iterative 

reconstruction technique.



FINDINGS:



LOWER THORAX:

Scattered atelectasis and consolidative changes at the lung bases.  7 

millimeter nodular density seen within the medial aspect of the left 

upper lobe on series 3, image 1. 



LIVER:

Mild intrahepatic biliary ductal dilatation. 



GALLBLADDER AND BILE DUCTS:

Prior cholecystectomy. 



PANCREAS:

Unremarkable. No gross lesion or ductal dilatation.



SPLEEN:

Unremarkable. 



ADRENALS:

Unremarkable. No mass. 



KIDNEYS AND URETERS:

Unremarkable. No hydronephrosis. No solid mass.  Punctate 

subcentimeter hypodensity seen within the mid to lower pole of the 

right kidney, too small to adequately characterize. 



VASCULATURE:

Unremarkable. No aortic aneurysm. Aortic atherosclerotic 

calcification or mural plaque present.



BOWEL:

Unremarkable. No obstruction. No gross mural thickening.  Small 

hiatal hernia.  Moderate fecal retention in the colon. 



APPENDIX:

Unremarkable. Normal appendix. 



PERITONEUM:

Unremarkable. No free fluid. No free air. 



LYMPH NODES:

Unremarkable. No enlarged lymph nodes. 



BLADDER:

Unremarkable. 



REPRODUCTIVE:

Heterogeneous multi fibroid uterus.  Correlation with pelvic 

ultrasound may be helpful if clinically indicated.  Prominence of the 

bilateral adnexa with some trace free fluid adjacent to the bilateral 

adnexa.  Again correlation with pelvic ultrasound may be helpful if 

clinically indicated. 



BONES:

Degenerative changes in the spine and shoulders.  Mild retrolisthesis 

of L5 on S1.  



OTHER FINDINGS:

Reticulation and edema within the circumferential subcutaneous soft 

tissues suggestive for anasarca.



IMPRESSION:

Bulky heterogeneous multi fibroid uterus.  Prominence of bilateral 

adnexa.  Trace free fluid within the bilateral adnexa and pelvic 

cul-de-sac.  Correlation with pelvic ultrasound may be helpful if 

clinically indicated.  



Moderate fecal retention in the colon.  



Small hiatal hernia. 



Additional findings as above. 



A preliminary report was generated at 9:09 p.m. on 11/30/2018 by Dr. Dakotah Rios from [email or web address].

## 2019-01-22 ENCOUNTER — HOSPITAL ENCOUNTER (EMERGENCY)
Dept: HOSPITAL 14 - H.ER | Age: 54
LOS: 1 days | Discharge: HOME | End: 2019-01-23
Payer: MEDICARE

## 2019-01-22 VITALS — OXYGEN SATURATION: 98 %

## 2019-01-22 VITALS — BODY MASS INDEX: 39.5 KG/M2

## 2019-01-22 DIAGNOSIS — I10: ICD-10-CM

## 2019-01-22 DIAGNOSIS — J45.909: ICD-10-CM

## 2019-01-22 DIAGNOSIS — E11.9: ICD-10-CM

## 2019-01-22 DIAGNOSIS — K59.00: Primary | ICD-10-CM

## 2019-01-22 DIAGNOSIS — Z79.4: ICD-10-CM

## 2019-01-22 DIAGNOSIS — G40.909: ICD-10-CM

## 2019-01-22 DIAGNOSIS — E05.90: ICD-10-CM

## 2019-01-22 PROCEDURE — 99284 EMERGENCY DEPT VISIT MOD MDM: CPT

## 2019-01-22 PROCEDURE — 96361 HYDRATE IV INFUSION ADD-ON: CPT

## 2019-01-22 PROCEDURE — 74176 CT ABD & PELVIS W/O CONTRAST: CPT

## 2019-01-22 PROCEDURE — 96374 THER/PROPH/DIAG INJ IV PUSH: CPT

## 2019-01-22 PROCEDURE — 83690 ASSAY OF LIPASE: CPT

## 2019-01-22 PROCEDURE — 81025 URINE PREGNANCY TEST: CPT

## 2019-01-22 PROCEDURE — 85025 COMPLETE CBC W/AUTO DIFF WBC: CPT

## 2019-01-22 PROCEDURE — 81003 URINALYSIS AUTO W/O SCOPE: CPT

## 2019-01-22 PROCEDURE — 80053 COMPREHEN METABOLIC PANEL: CPT

## 2019-01-23 VITALS
HEART RATE: 80 BPM | DIASTOLIC BLOOD PRESSURE: 92 MMHG | SYSTOLIC BLOOD PRESSURE: 159 MMHG | RESPIRATION RATE: 17 BRPM | TEMPERATURE: 98.5 F

## 2019-01-23 LAB
ALBUMIN SERPL-MCNC: 3.8 G/DL (ref 3.5–5)
ALBUMIN/GLOB SERPL: 1.1 {RATIO} (ref 1–2.1)
ALT SERPL-CCNC: 25 U/L (ref 9–52)
AST SERPL-CCNC: 26 U/L (ref 14–36)
BASOPHILS # BLD AUTO: 0.1 K/UL (ref 0–0.2)
BASOPHILS NFR BLD: 1 % (ref 0–2)
BILIRUB UR-MCNC: NEGATIVE MG/DL
BUN SERPL-MCNC: 25 MG/DL (ref 7–17)
CALCIUM SERPL-MCNC: 9.5 MG/DL (ref 8.4–10.2)
COLOR UR: YELLOW
EOSINOPHIL # BLD AUTO: 0.4 K/UL (ref 0–0.7)
EOSINOPHIL NFR BLD: 3.2 % (ref 0–4)
ERYTHROCYTE [DISTWIDTH] IN BLOOD BY AUTOMATED COUNT: 14 % (ref 11.5–14.5)
GFR NON-AFRICAN AMERICAN: 43
GLUCOSE UR STRIP-MCNC: NEGATIVE MG/DL
HGB BLD-MCNC: 11.7 G/DL (ref 12–16)
LEUKOCYTE ESTERASE UR-ACNC: NEGATIVE LEU/UL
LIPASE SERPL-CCNC: 97 U/L (ref 23–300)
LYMPHOCYTES # BLD AUTO: 2.4 K/UL (ref 1–4.3)
LYMPHOCYTES NFR BLD AUTO: 21.6 % (ref 20–40)
MCH RBC QN AUTO: 29.7 PG (ref 27–31)
MCHC RBC AUTO-ENTMCNC: 33.6 G/DL (ref 33–37)
MCV RBC AUTO: 88.4 FL (ref 81–99)
MONOCYTES # BLD: 0.9 K/UL (ref 0–0.8)
MONOCYTES NFR BLD: 8.1 % (ref 0–10)
NEUTROPHILS # BLD: 7.4 K/UL (ref 1.8–7)
NEUTROPHILS NFR BLD AUTO: 66.1 % (ref 50–75)
NRBC BLD AUTO-RTO: 0.1 % (ref 0–0)
PH UR STRIP: 6 [PH] (ref 5–8)
PLATELET # BLD: 305 K/UL (ref 130–400)
PMV BLD AUTO: 9.9 FL (ref 7.2–11.7)
PROT UR STRIP-MCNC: >=300 MG/DL
RBC # BLD AUTO: 3.94 MIL/UL (ref 3.8–5.2)
RBC # UR STRIP: NEGATIVE /UL
SP GR UR STRIP: 1.02 (ref 1–1.03)
SQUAMOUS EPITHIAL: 4 /HPF (ref 0–5)
URINE CLARITY: (no result)
UROBILINOGEN UR-MCNC: 0.2 MG/DL (ref 0.2–1)
WBC # BLD AUTO: 11.2 K/UL (ref 4.8–10.8)

## 2019-01-23 NOTE — CT
Date of service: 



01/23/2019



PROCEDURE:  CT Abdomen and Pelvis without intravenous contrast



HISTORY:

worsening abd pain



COMPARISON:

None.



TECHNIQUE:

Contiguous images were obtained from the domes of the diaphragms to 

the upper thighs without the administration of intravenous contrast. 

Oral contrast was not administered.



Radiation dose:



Total exam DLP = 829.88 mGy-cm.



This CT exam was performed using one or more of the following dose 

reduction techniques: Automated exposure control, adjustment of the 

mA and/or kV according to patient size, and/or use of iterative 

reconstruction technique.



FINDINGS:



LOWER THORAX:

Trace bibasilar linear atelectasis. 



LIVER:

Unremarkable. No gross lesion or ductal dilatation.  



GALLBLADDER AND BILE DUCTS:

Prior cholecystectomy. 



PANCREAS:

Unremarkable. No gross lesion or ductal dilatation.



SPLEEN:

Unremarkable. 



ADRENALS:

Unremarkable. No mass. 



KIDNEYS AND URETERS:

Unremarkable. No hydronephrosis. No solid mass. 



VASCULATURE:

Unremarkable. No aortic aneurysm. No aortic atherosclerotic 

calcification or mural plaque present.



BOWEL:

Unremarkable. No obstruction. No gross mural thickening. 



APPENDIX:

Unremarkable. Normal appendix. 



PERITONEUM:

Abdominal wall protrusion at the level of the umbilicus.  No free 

fluid. No free air. 



LYMPH NODES:

Unremarkable. No enlarged lymph nodes. 



BLADDER:

Unremarkable. 



REPRODUCTIVE:

Unremarkable. 



BONES:

No acute fracture. 



OTHER FINDINGS:

None.



IMPRESSION:

No acute abdominal pelvic pathology.

## 2019-01-23 NOTE — ED PDOC
HPI: Abdomen


Time Seen by Provider: 01/22/19 23:37


Chief Complaint (Nursing): Abdominal Pain


Chief Complaint (Provider): abdominal pain


History Per: Patient


History/Exam Limitations: no limitations


Onset/Duration Of Symptoms: Days (November)


Current Symptoms Are (Timing): Still Present


Additional Complaint(s): 





Lynda Kent is a 53 year old female, with a past medical history of diabetes, 

HTN and seizure disorder, who presents to the emergency department complaining 

of having an abdominal pain since November. Patient states shes been to 

multiple ERs and has had numerous CT scans done that showed questionable hernia.

Patient has followed up with GI doctor and was prescribed Omeprazole, she was 

scheduled for an EGD in January 4th but was cancelled by doctor because she 

needed medical clearance from her primary doctor due to her seizure disorder. 

Patient states she tried Carafate, Omeprazole and has been taking extra doses of

Lyrica and Klonopin to sleep. Patient couldnt stand the pain anymore prompting 

ED visit, she wants to be admitted to have something done. She denies any 

other medical complaints.





PMD: Clinic





Past Medical History


Reviewed: Historical Data, Nursing Documentation, Vital Signs


Vital Signs: 





                                Last Vital Signs











Temp  97.5 F L  01/22/19 23:37


 


Pulse  70   01/22/19 23:37


 


Resp  16   01/22/19 23:37


 


BP  151/95 H  01/22/19 23:37


 


Pulse Ox  98   01/22/19 23:37














- Medical History


PMH: Asthma, Diabetes, HTN, Hyperthyroidism, Seizures





- Surgical History


Surgical History: Cholecystectomy





- Family History


Family History: States: Unknown Family Hx





- Immunization History


Hx Tetanus Toxoid Vaccination: No


Hx Influenza Vaccination: No


Hx Pneumococcal Vaccination: No





- Home Medications


Home Medications: 


                                Ambulatory Orders











 Medication  Instructions  Recorded


 


Ibuprofen 800 mg PO TID PRN 05/17/14


 


Insulin Glargine,Hum.rec.anlog 23 unit SC BID 05/17/14





[Lantus]  


 


Insulin Lispro, Recombinant 14 units SC TIDPC 05/17/14





[Humalog]  


 


Metformin HCl [Metformin] 1,000 mg PO DAILY 05/17/14


 


Carbamazepine [Carbamazepine ER] 300 mg PO BID 11/30/18


 


Exenatide Microspheres [Bydureon 2 mg SC QWK 11/30/18





Pen]  


 


Famotidine [Pepcid AC] 10 mg PO QN #30 tablet 11/30/18


 


Lansoprazole [Prevacid] 15 mg PO DAILY #30 ecc 11/30/18


 


Lisinopril [Zestril] 40 mg PO DAILY 11/30/18


 


Metoprolol Succinate 100 mg PO DAILY 11/30/18


 


OXcarbazepine [Trileptal] 600 mg PO TID 11/30/18


 


Sucralfate [Carafate] 1 gm PO QID #28 tab 11/30/18


 


amLODIPine [Norvasc] 10 mg PO DAILY 11/30/18


 


Docusate Sodium [Dulcolax Stool 100 mg PO DAILY #12 capsule 01/23/19





Softener]  


 


Nitrofurantoin Macrocrystals 100 mg PO BID 5 Days  cap 01/23/19





[Macrobid]  














- Allergies


Allergies/Adverse Reactions: 


                                    Allergies











Allergy/AdvReac Type Severity Reaction Status Date / Time


 


No Known Allergies Allergy   Verified 11/30/18 15:07














Review of Systems


ROS Statement: Except As Marked, All Systems Reviewed And Found Negative


Gastrointestinal: Positive for: Abdominal Pain





Physical Exam





- Reviewed


Nursing Documentation Reviewed: Yes


Vital Signs Reviewed: Yes





- Physical Exam


Appears: Positive for: No Acute Distress


Head Exam: Positive for: ATRAUMATIC, NORMAL INSPECTION, NORMOCEPHALIC


Skin: Positive for: Normal Color, Warm, Dry


Eye Exam: Positive for: Normal appearance, EOMI, PERRL


Neck: Positive for: Normal, Painless ROM


Cardiovascular/Chest: Positive for: Regular Rate, Rhythm.  Negative for: Murmur


Respiratory: Positive for: Normal Breath Sounds.  Negative for: Respiratory 

Distress


Gastrointestinal/Abdominal: Positive for: Soft, Tenderness (to palpation in 

epigastric region), Other (obese, multiple surgical scars).  Negative for: 

Guarding, Rebound


Back: Positive for: Normal Inspection.  Negative for: L CVA Tenderness, R CVA 

Tenderness, Vertebral Tenderness


Extremity: Positive for: Normal ROM (upper and lower extremities).  Negative 

for: Deformity, Swelling


Neurologic/Psych: Positive for: Alert, Oriented.  Negative for: Motor/Sensory 

Deficits





- Laboratory Results


Result Diagrams: 


                                 01/23/19 00:45








- ECG


O2 Sat by Pulse Oximetry: 98 (RA)





Medical Decision Making


Medical Decision Making: 





Time: 23:37


A/P: 54 y/o female with history of diabetes, HTN and seizure disorder presenting

 with acute on chronic exacerbation of abdominal pain. Pain may be secondary to 

worsening hernia vs gastritis, GERD, pancreatitis and colitis.





Initial Plan:


--Abdomen & Pelvis [CT]


--CMP


--Lipase


--Drug screen, urine


--CBC w/ differential


--Omnipaque 240 50 ml PO


--Enulose 20gm PO


--NaCl 1,000 ml IV 1,000 mls/hr


--Protonix Inj 40 mg IVP


--Reevaluation





05:14


Abdomen/Pelvis CT


FINDINGS:


Bilateral basilar atelectatic pulmonary changes.





Normal unenhanced liver. Surgically absent gallbladder and nondilated 

extrahepatic biliary system.  Normal unenhanced spleen.  Normal pancreas.


 


Normal bilateral adrenal glands.





Normal size of the right kidney.  There is no right renal mass.  There are no 

right renal calculi.  There is no right hydronephrosis.  Normal visualized right

 ureter.





Normal size of the left kidney.  There is no left renal mass.  There are no left

 renal calculi.  There is no left hydronephrosis.  Normal visualized left 

ureter.





Normal visualized stomach.  Normal small intestine.  Normal colon.  The appendix

 is visualized and appears normal.          





There is no demonstrated peritoneal fluid.





Normal abdominal aorta.  Normal inferior vena cava.  Normal retroperitoneum.


 


Mild diffuse thickening of the urinary bladder.  There is no pelvic mass lesion 

or lymphadenopathy.  There is no pelvic fluid.     


 


Normal abdominal wall.  Normal osseous structures.





IMPRESSION:


Mild thickening of the bladder. Underdistention versus mild cystitis.


Mild amount of fecal residue in the large bowel.








05:40


Discussed with patients results of CT. Advised patient to follow up with GI 

doctor this week for colonoscopy scheduling. Patient advised on stool softeners 

and diet. Patient is well appearing and medically stable for discharge. 


-------------------------

------------------------------------------------------------------------


Scribe Attestation:


Documented by Merlin Hopper, acting as a scribe for Derrell Trinidad MD





Provider Scribe Attestation:


All medical record entries made by the Scribe were at my direction and 

personally dictated by me. I have reviewed the chart and agree that the record 

accurately reflects my personal performance of the history, physical exam, 

medical decision making, and the department course for this patient. I have also

 personally directed, reviewed, and agree with the discharge instructions and di

sposition.





Disposition





- Clinical Impression


Clinical Impression: 


 Constipation








- Disposition


Referrals: 


Abdiel Ferguson MD, PhD [Staff Provider] - 


Yong Stanton MD [Staff Provider] - 


Disposition: Routine/Home


Disposition Time: 05:40


Condition: STABLE


Prescriptions: 


Docusate Sodium [Dulcolax Stool Softener] 100 mg PO DAILY #12 capsule


Nitrofurantoin Macrocrystals [Macrobid] 100 mg PO BID 5 Days  cap


Instructions:  Constipation in Adults


Forms:  [employer] (English)

## 2020-03-17 ENCOUNTER — TELEPHONE (OUTPATIENT)
Dept: FAMILY MEDICINE CLINIC | Facility: CLINIC | Age: 55
End: 2020-03-17

## 2020-03-17 NOTE — TELEPHONE ENCOUNTER
SIGNATURES NEEDED FOR cancer form  FORM RECEIVED VIA FAX  WILL BE PLACED IN YOUR BIN AT ASSIGNED DELIVERY TIMES